# Patient Record
Sex: FEMALE | Race: ASIAN | NOT HISPANIC OR LATINO | URBAN - METROPOLITAN AREA
[De-identification: names, ages, dates, MRNs, and addresses within clinical notes are randomized per-mention and may not be internally consistent; named-entity substitution may affect disease eponyms.]

---

## 2017-03-06 ENCOUNTER — OFFICE VISIT - HEALTHEAST (OUTPATIENT)
Dept: FAMILY MEDICINE | Facility: CLINIC | Age: 62
End: 2017-03-06

## 2017-03-06 DIAGNOSIS — E55.9 VITAMIN D DEFICIENCY: ICD-10-CM

## 2017-03-06 DIAGNOSIS — Z00.00 ROUTINE GENERAL MEDICAL EXAMINATION AT A HEALTH CARE FACILITY: ICD-10-CM

## 2017-03-06 DIAGNOSIS — R17 TOTAL BILIRUBIN, ELEVATED: ICD-10-CM

## 2017-03-06 LAB
CHOLEST SERPL-MCNC: 198 MG/DL
FASTING STATUS PATIENT QL REPORTED: YES
HDLC SERPL-MCNC: 62 MG/DL
LDLC SERPL CALC-MCNC: 117 MG/DL
TRIGL SERPL-MCNC: 97 MG/DL

## 2017-03-06 ASSESSMENT — MIFFLIN-ST. JEOR: SCORE: 986.33

## 2017-03-08 ENCOUNTER — AMBULATORY - HEALTHEAST (OUTPATIENT)
Dept: FAMILY MEDICINE | Facility: CLINIC | Age: 62
End: 2017-03-08

## 2017-03-09 LAB
HPV INTERPRETATION - HISTORICAL: NORMAL
HPV INTERPRETER - HISTORICAL: NORMAL

## 2017-03-10 ENCOUNTER — RECORDS - HEALTHEAST (OUTPATIENT)
Dept: ADMINISTRATIVE | Facility: OTHER | Age: 62
End: 2017-03-10

## 2017-03-10 ENCOUNTER — RECORDS - HEALTHEAST (OUTPATIENT)
Dept: BONE DENSITY | Facility: CLINIC | Age: 62
End: 2017-03-10

## 2017-03-10 DIAGNOSIS — Z00.00 ENCOUNTER FOR GENERAL ADULT MEDICAL EXAMINATION WITHOUT ABNORMAL FINDINGS: ICD-10-CM

## 2017-03-13 LAB
BKR LAB AP ABNORMAL BLEEDING: NO
BKR LAB AP BIRTH CONTROL/HORMONES: NORMAL
BKR LAB AP CERVICAL APPEARANCE: NORMAL
BKR LAB AP GYN ADEQUACY: NORMAL
BKR LAB AP GYN INTERPRETATION: NORMAL
BKR LAB AP HPV REFLEX: NORMAL
BKR LAB AP LMP: NORMAL
BKR LAB AP PATIENT STATUS: NORMAL
BKR LAB AP PREVIOUS ABNORMAL: NORMAL
BKR LAB AP PREVIOUS NORMAL: 2013
HIGH RISK?: NO
PATH REPORT.COMMENTS IMP SPEC: NORMAL
RESULT FLAG (HE HISTORICAL CONVERSION): NORMAL

## 2017-03-22 ENCOUNTER — AMBULATORY - HEALTHEAST (OUTPATIENT)
Dept: NURSING | Facility: CLINIC | Age: 62
End: 2017-03-22

## 2017-03-26 ENCOUNTER — COMMUNICATION - HEALTHEAST (OUTPATIENT)
Dept: FAMILY MEDICINE | Facility: CLINIC | Age: 62
End: 2017-03-26

## 2017-06-01 ENCOUNTER — COMMUNICATION - HEALTHEAST (OUTPATIENT)
Dept: FAMILY MEDICINE | Facility: CLINIC | Age: 62
End: 2017-06-01

## 2017-06-05 ENCOUNTER — COMMUNICATION - HEALTHEAST (OUTPATIENT)
Dept: FAMILY MEDICINE | Facility: CLINIC | Age: 62
End: 2017-06-05

## 2017-06-13 ENCOUNTER — OFFICE VISIT - HEALTHEAST (OUTPATIENT)
Dept: FAMILY MEDICINE | Facility: CLINIC | Age: 62
End: 2017-06-13

## 2017-06-13 DIAGNOSIS — M81.0 OSTEOPOROSIS: ICD-10-CM

## 2017-06-13 ASSESSMENT — MIFFLIN-ST. JEOR: SCORE: 990.87

## 2017-07-11 ENCOUNTER — COMMUNICATION - HEALTHEAST (OUTPATIENT)
Dept: FAMILY MEDICINE | Facility: CLINIC | Age: 62
End: 2017-07-11

## 2017-12-14 ENCOUNTER — COMMUNICATION - HEALTHEAST (OUTPATIENT)
Dept: FAMILY MEDICINE | Facility: CLINIC | Age: 62
End: 2017-12-14

## 2017-12-18 ENCOUNTER — COMMUNICATION - HEALTHEAST (OUTPATIENT)
Dept: FAMILY MEDICINE | Facility: CLINIC | Age: 62
End: 2017-12-18

## 2018-03-01 ENCOUNTER — AMBULATORY - HEALTHEAST (OUTPATIENT)
Dept: FAMILY MEDICINE | Facility: CLINIC | Age: 63
End: 2018-03-01

## 2018-03-01 ENCOUNTER — AMBULATORY - HEALTHEAST (OUTPATIENT)
Dept: NURSING | Facility: CLINIC | Age: 63
End: 2018-03-01

## 2018-03-01 DIAGNOSIS — Z23 NEED FOR VACCINATION: ICD-10-CM

## 2018-04-23 ENCOUNTER — COMMUNICATION - HEALTHEAST (OUTPATIENT)
Dept: FAMILY MEDICINE | Facility: CLINIC | Age: 63
End: 2018-04-23

## 2018-04-23 ENCOUNTER — AMBULATORY - HEALTHEAST (OUTPATIENT)
Dept: SCHEDULING | Facility: CLINIC | Age: 63
End: 2018-04-23

## 2018-04-23 DIAGNOSIS — M81.0 OSTEOPOROSIS: ICD-10-CM

## 2018-05-10 ENCOUNTER — RECORDS - HEALTHEAST (OUTPATIENT)
Dept: GENERAL RADIOLOGY | Facility: CLINIC | Age: 63
End: 2018-05-10

## 2018-05-10 ENCOUNTER — OFFICE VISIT - HEALTHEAST (OUTPATIENT)
Dept: FAMILY MEDICINE | Facility: CLINIC | Age: 63
End: 2018-05-10

## 2018-05-10 DIAGNOSIS — Z00.00 ROUTINE GENERAL MEDICAL EXAMINATION AT A HEALTH CARE FACILITY: ICD-10-CM

## 2018-05-10 DIAGNOSIS — M25.552 LEFT HIP PAIN: ICD-10-CM

## 2018-05-10 DIAGNOSIS — M25.552 PAIN IN LEFT HIP: ICD-10-CM

## 2018-05-10 DIAGNOSIS — M81.0 OSTEOPOROSIS: ICD-10-CM

## 2018-05-10 LAB
CHOLEST SERPL-MCNC: 229 MG/DL
FASTING STATUS PATIENT QL REPORTED: YES
FASTING STATUS PATIENT QL REPORTED: YES
GLUCOSE BLD-MCNC: 86 MG/DL (ref 70–125)
HDLC SERPL-MCNC: 75 MG/DL
HGB BLD-MCNC: 13.4 G/DL (ref 12–16)
LDLC SERPL CALC-MCNC: 140 MG/DL
TRIGL SERPL-MCNC: 70 MG/DL
TSH SERPL DL<=0.005 MIU/L-ACNC: 1.96 UIU/ML (ref 0.3–5)

## 2018-05-10 ASSESSMENT — MIFFLIN-ST. JEOR: SCORE: 991.44

## 2018-05-11 LAB — 25(OH)D3 SERPL-MCNC: 33.9 NG/ML (ref 30–80)

## 2018-05-14 ENCOUNTER — COMMUNICATION - HEALTHEAST (OUTPATIENT)
Dept: FAMILY MEDICINE | Facility: CLINIC | Age: 63
End: 2018-05-14

## 2018-10-19 ENCOUNTER — COMMUNICATION - HEALTHEAST (OUTPATIENT)
Dept: FAMILY MEDICINE | Facility: CLINIC | Age: 63
End: 2018-10-19

## 2019-03-25 ENCOUNTER — COMMUNICATION - HEALTHEAST (OUTPATIENT)
Dept: FAMILY MEDICINE | Facility: CLINIC | Age: 64
End: 2019-03-25

## 2019-03-30 ENCOUNTER — AMBULATORY - HEALTHEAST (OUTPATIENT)
Dept: FAMILY MEDICINE | Facility: CLINIC | Age: 64
End: 2019-03-30

## 2019-03-30 DIAGNOSIS — M81.8 OTHER OSTEOPOROSIS WITHOUT CURRENT PATHOLOGICAL FRACTURE: ICD-10-CM

## 2019-04-01 ENCOUNTER — AMBULATORY - HEALTHEAST (OUTPATIENT)
Dept: SCHEDULING | Facility: CLINIC | Age: 64
End: 2019-04-01

## 2019-04-01 DIAGNOSIS — M81.8 OTHER OSTEOPOROSIS WITHOUT CURRENT PATHOLOGICAL FRACTURE: ICD-10-CM

## 2019-04-06 ENCOUNTER — COMMUNICATION - HEALTHEAST (OUTPATIENT)
Dept: FAMILY MEDICINE | Facility: CLINIC | Age: 64
End: 2019-04-06

## 2019-04-06 DIAGNOSIS — M80.00XG AGE-RELATED OSTEOPOROSIS WITH CURRENT PATHOLOGICAL FRACTURE WITH DELAYED HEALING, SUBSEQUENT ENCOUNTER: ICD-10-CM

## 2019-05-08 ENCOUNTER — COMMUNICATION - HEALTHEAST (OUTPATIENT)
Dept: FAMILY MEDICINE | Facility: CLINIC | Age: 64
End: 2019-05-08

## 2019-05-08 DIAGNOSIS — M80.00XG AGE-RELATED OSTEOPOROSIS WITH CURRENT PATHOLOGICAL FRACTURE WITH DELAYED HEALING, SUBSEQUENT ENCOUNTER: ICD-10-CM

## 2019-11-06 ENCOUNTER — COMMUNICATION - HEALTHEAST (OUTPATIENT)
Dept: FAMILY MEDICINE | Facility: CLINIC | Age: 64
End: 2019-11-06

## 2019-11-06 DIAGNOSIS — M80.00XG AGE-RELATED OSTEOPOROSIS WITH CURRENT PATHOLOGICAL FRACTURE WITH DELAYED HEALING, SUBSEQUENT ENCOUNTER: ICD-10-CM

## 2019-11-07 RX ORDER — ALENDRONATE SODIUM 70 MG/1
70 TABLET ORAL
Qty: 16 TABLET | Refills: 1 | Status: SHIPPED | OUTPATIENT
Start: 2019-11-07

## 2021-05-29 ENCOUNTER — RECORDS - HEALTHEAST (OUTPATIENT)
Dept: ADMINISTRATIVE | Facility: CLINIC | Age: 66
End: 2021-05-29

## 2021-05-30 ENCOUNTER — RECORDS - HEALTHEAST (OUTPATIENT)
Dept: ADMINISTRATIVE | Facility: CLINIC | Age: 66
End: 2021-05-30

## 2021-05-30 VITALS — BODY MASS INDEX: 18.25 KG/M2 | WEIGHT: 103 LBS | HEIGHT: 63 IN

## 2021-05-31 VITALS — HEIGHT: 63 IN | WEIGHT: 104 LBS | BODY MASS INDEX: 18.43 KG/M2

## 2021-06-01 VITALS — BODY MASS INDEX: 18.61 KG/M2 | HEIGHT: 63 IN | WEIGHT: 105 LBS

## 2021-06-09 NOTE — PROGRESS NOTES
Assessment/Plan:       1. Routine general medical examination at a health care facility  Healthy 62-year-old female.  We will refer her for a bone density scan.  We'll do a screening hemoglobin glucose and lipid cascade.  In the past her cholesterol is been quite good.  She is of normal weight and eat healthy and remains active.  Family history is fairly unremarkable and she is up-to-date on her mammogram and colonoscopy.  - DXA Bone Density Scan; Future  - Hemoglobin  - Lipid Cascade FASTING  - Varicella Zoster Immune Status Antibody, IgG  - Glucose    2. Total bilirubin, elevated  She's had an elevated isolated total bilirubin level.  No abdominal symptoms, risk factors for liver disease.  We will check a hepatic profile again this year.  - Hepatic Profile    3. Vitamin D deficiency  She has a history of having low vitamin D levels and is currently taking a supplement.  - Vitamin D, Total (25-Hydroxy)    4.  She reported that her  noted a slight head twitching.  He states that it's been fairly common.  On exam today there is no evidence of the twitch and no other tremor or gait disorder noted.  We discussed that if her symptoms are increasing that she does need to return for further evaluation.    5.  Intermittent hoarseness when she is talking too much.  Exam today is benign.  No history of smoking.  Encouraged her to stay well hydrated and follow up if the symptoms are worsening.          Subjective:     Eleni Bledsoe is a 62 y.o. female who presents for an annual exam.     Healthy Habits:   Healthy Diet: Yes  Regular Exercise: Yes  Sunscreen Use: Yes  Dental Visits Regularly: Yes  Seat Belt: Yes  Self Breast Exam Monthly:Yes    Health Maintenance reviewed - yes.  Lipid Profile: Yes  Glucose Screen: Yes  Last Mammogram: Yes  Colonoscopy: Yes  Last Dexa: No    Immunization History   Administered Date(s) Administered     Influenza, seasonal,quad inj 6-35 mos 11/05/2012     Tdap 01/17/2012     Immunization  status: up to date and documented.      Gynecologic History  No LMP recorded. Patient is postmenopausal.  Sexually active: Yes  Contraception: post menopausal status  Last Pap: . Results were: ascus      OB History    Para Term  AB SAB TAB Ectopic Multiple Living   1 1 1             # Outcome Date GA Lbr Magdiel/2nd Weight Sex Delivery Anes PTL Lv   1 Term                   Current Outpatient Prescriptions   Medication Sig Dispense Refill     co-enzyme Q-10 30 mg capsule Take 30 mg by mouth 3 (three) times a day.       multivitamin therapeutic (THERAGRAN) tablet Take 1 tablet by mouth daily.       MV,CA,MIN/FA/HERBAL NO.157 (ESTROVEN MAXIMUM STRENGTH ORAL) Take 1 capsule by mouth daily.       No current facility-administered medications for this visit.      History reviewed. No pertinent past medical history.  History reviewed. No pertinent surgical history.  Review of patient's allergies indicates no known allergies.  Family History   Problem Relation Age of Onset     No Medical Problems Mother      No Medical Problems Father      No Medical Problems Sister      No Medical Problems Daughter      No Medical Problems Maternal Grandmother      No Medical Problems Maternal Grandfather      No Medical Problems Paternal Grandmother      No Medical Problems Paternal Grandfather      No Medical Problems Maternal Aunt      No Medical Problems Paternal Aunt      BRCA 1/2 Neg Hx      Breast cancer Neg Hx      Cancer Neg Hx      Colon cancer Neg Hx      Endometrial cancer Neg Hx      Ovarian cancer Neg Hx      Social History     Social History     Marital status:      Spouse name: N/A     Number of children: N/A     Years of education: N/A     Occupational History     Not on file.     Social History Main Topics     Smoking status: Never Smoker     Smokeless tobacco: Not on file     Alcohol use Not on file     Drug use: Not on file     Sexual activity: Not on file     Other Topics Concern     Not on file  "    Social History Narrative       Review of Systems  General:  Denies problem  Eyes: Denies problem  Ears/Nose/Throat: Denies problem  Cardiovascular: Denies problem  Respiratory:  Denies problem  Gastrointestinal:  Denies problem, Genitourinary: Denies problem  Musculoskeletal:  Denies problem  Skin: Denies problem  Neurologic: Denies problem  Psychiatric: Denies problem  Endocrine: Denies problem  Heme/Lymphatic: Denies problem   Allergic/Immunologic: Denies problem            Objective:        Vitals:    03/06/17 0944   BP: 100/70   Pulse: 72   Resp: 18   Weight: 103 lb (46.7 kg)   Height: 5' 3\" (1.6 m)       Physical Exam:  General Appearance: Alert, pleasant, appears stated age  Head: Normocephalic, without obvious abnormality  Eyes: PERRL, conjunctiva/corneas clear, EOM's intact  Ears: Normal TM's and external ear canals, both ears  Nose: Nares normal, septum midline,mucosa normal, no drainage  Throat: Lips, mucosa, and tongue normal; teeth and gums normal; oropharynx is clear  Neck: Supple,without lymphadenopathy or thyromegally  Lungs: Clear to auscultation bilaterally, respirations unlabored  Breasts: Nopalpable masses, tenderness, asymmetry, or nipple discharge. No axillary or supraclavicular lymphadenopathy  Heart: Regular rate and rhythm, no murmur   Abdomen: Soft, non-tender, no masses, no organomegaly  Pelvic:Normally developed genitalia with no external lesions or eruptions. Vagina and cervix show no lesions, inflammation, discharge or tenderness. No cystocele, No rectocele. Uterus normal.  No adnexal mass or tenderness.    Extremities: Extremities with strong and symmetric pulses, no cyanosis or edema  Skin: Skin color, texture normal, no rashes or lesions    Neurologic: Normal          "

## 2021-06-16 PROBLEM — M81.0 OSTEOPOROSIS: Status: ACTIVE | Noted: 2017-06-13

## 2021-06-17 NOTE — PROGRESS NOTES
ASSESSMENT/PLAN:  1. Routine general medical examination at a health care facility  63-year-old female.  She is up-to-date on her Pap smears.  She is advised every 2 year mammogram screenings.  Will check fasting labs today.  Encouraged healthy eating and weightbearing exercise.  - Lipid Cascade FASTING  - Glucose  - Hemoglobin    2. Osteoporosis  Known osteoporosis.  Her bone density scan is stable from last year but still is showing osteoporosis.  She is at high risk for fracture.  I do encourage her to start Fosamax and she agrees to try.  We discussed the side effects of the medication and how to take it.  Will recheck bone density in 1 year.  Encouraged her to continue with 1200 mg of calcium 1000 mg of vitamin D as well as weightbearing exercise and resistance training.  - Thyroid Cascade  - Vitamin D, Total (25-Hydroxy)    3. Left hip pain  Mild left hip pain.  Pain patterns consistent with mild osteoarthritis.  Exam today is normal and hip x-ray is also normal.  Encouraged her to continue to wear good shoes and continue to be active and follow-up if symptoms are worsening.  - XR Hip Left 2 or More VWS; Future    Feeling of globus in her throat with some difficulty swallowing.  Does not happen all the time and has been present for about a year.  We decided to do a trial of omeprazole 20 mg daily for over a month.  If the symptoms do not improve, consider referral to ENT.  There were no masses palpable.  An oral exam is normal.  She is not a smoker.    General Health Maintenance  Her most recent DXA was completed in 2018 and revealed osteoporosis and high fracture risk with a lowest T-score of -3.0 in the right femoral neck. She is to have a follow up DXA 1-2 year(s) from the date it was completed.  Her most recent mammogram was completed in 2016 and revealed no radiographic evidence of malignancy. She is to continue routine screening mammogram as recommended. She is due for a mammogram.  Her most recent PAP  smear was completed in 2017 and was normal.   Her most recent colonoscopy was completed in 2013 and was normal. She is on a 10-year screening plan.    Patient Instructions   Start taking Fosamax once weekly. The biggest side effects are heartburn and irritation to the esophagus. These can be severe. Osteonecrosis of the jaw can happen, however this side effect is rare and occurs mostly for those with a history of jaw cancer or with major dental work.    To minimize adverse side effects:   - Take first thing in the morning, sitting up, before you eat  - Take it at least 30 minutes before you eat anything    Repeat bone density scan (DXA) in 1 year to see how the Fosamax is working.     Continue taking Calcium and Vitamin D supplements.     Start taking Prilosec (omeprazole) for reflux.  - Taking this medication may help minimize throat irritation side effects from Fosamax at the beginning  - If you stop taking omeprazole, you could start to experience irritation to the esophagus from the Fosamax      Orders Placed This Encounter   Procedures     XR Hip Left 2 or More VWS     Standing Status:   Future     Number of Occurrences:   1     Standing Expiration Date:   5/10/2019     Order Specific Question:   Can the procedure be changed per Radiologist protocol?     Answer:   Yes     Lipid [place] FASTING     Order Specific Question:   Fasting is required?     Answer:   Yes     Thyroid Cascade     Vitamin D, Total (25-Hydroxy)     Glucose     Hemoglobin     Medications Discontinued During This Encounter   Medication Reason     alendronate (FOSAMAX) 70 MG tablet Side effects       No Follow-up on file.    CHIEF COMPLAINT:  Chief Complaint   Patient presents with     Annual Exam       HISTORY OF PRESENT ILLNESS:  Eleni is a 63 y.o. female presenting to the clinic today for an annual exam. She is doing well. An interpretor is used during this visit.     Left Hip Pain: She endorses occasional numbness in her left leg with  walking. She feels this was present last year, but that it is worsening now. If she walks long distances, she experiences stiffness in her left knee. At the start of her walk and after walking for an extended period of time, she feels the most discomfort. Stairs are not a problem for her. She denies back pain. She denies pain to palpation. She is wondering if arthritis would be related to osteoporosis.     Throat Discomfort: She endorses discomfort in her throat. Swallowing dry food worsens her symptoms. She feels she cannot swallow smoothly. She does not cough after meals. This discomfort is present with only dry food and when she talks for longer periods of time. If she talks for extended periods of time, she does feel her voice changes. She does not feel this is worsening. She endorses an intermittent dry cough after eating. She declines an upper endoscopy at this time.     REVIEW OF SYSTEMS:   Osteoporosis: Her most recent DXA was completed in 2018 and revealed high fracture risk with a lowest T-score of -3.0 in the right femoral neck. She was previously started on Fosamax 6/13/17, but she did not ever take the medication. She was told about the side effects and she did not want to take it. She takes a calcium supplement and tries to exercise more. She is agreeable to trying Fosamax at this time. She is wondering if other vitamins are helpful, aside from calcium and vitamin D.     She denies problems with bowel or bladder function. She sees a dentist regularly. Her hearing is good. Her vision is unchanged. She wears glasses. All other systems are negative.    PFSH:  She denies a family history of thyroid problems. Reviewed, as below.    History   Smoking Status     Never Smoker   Smokeless Tobacco     Never Used       Family History   Problem Relation Age of Onset     No Medical Problems Mother      No Medical Problems Father      No Medical Problems Sister      No Medical Problems Daughter      No Medical  "Problems Maternal Grandmother      No Medical Problems Maternal Grandfather      No Medical Problems Paternal Grandmother      No Medical Problems Paternal Grandfather      No Medical Problems Maternal Aunt      No Medical Problems Paternal Aunt      BRCA 1/2 Neg Hx      Breast cancer Neg Hx      Cancer Neg Hx      Colon cancer Neg Hx      Endometrial cancer Neg Hx      Ovarian cancer Neg Hx      Thyroid disease Neg Hx        Social History     Social History     Marital status:      Spouse name: N/A     Number of children: N/A     Years of education: N/A     Occupational History     Not on file.     Social History Main Topics     Smoking status: Never Smoker     Smokeless tobacco: Never Used     Alcohol use Not on file     Drug use: Not on file     Sexual activity: Not on file     Other Topics Concern     Not on file     Social History Narrative     History reviewed. No pertinent surgical history.    No Known Allergies    Active Ambulatory Problems     Diagnosis Date Noted     Arthropathy Of Multiple Sites      Osteoporosis 06/13/2017     Resolved Ambulatory Problems     Diagnosis Date Noted     Atypical Chest Pain      Hoarseness      Urticaria      Dermatitis      No Additional Past Medical History       VITALS:  Vitals:    05/10/18 0825   BP: 102/80   Pulse: 72   Resp: 16   Weight: 105 lb (47.6 kg)   Height: 5' 2.75\" (1.594 m)     Wt Readings from Last 3 Encounters:   05/10/18 105 lb (47.6 kg)   06/13/17 104 lb (47.2 kg)   03/06/17 103 lb (46.7 kg)     Body mass index is 18.75 kg/(m^2).    PHYSICAL EXAM:  General Appearance: Alert, pleasant, appears stated age  Head: Normocephalic, without obvious abnormality  Eyes: PERRL, conjunctiva/corneas clear, EOM's intact  Ears: Normal TM's and external ear canals, both ears  Nose: Nares normal, septum midline,mucosa normal, no drainage  Throat: Lips, mucosa, and tongue normal; teeth and gums normal; oropharynx is clear  Neck: Supple, without lymphadenopathy or " thyromegaly  Lungs: Clear to auscultation bilaterally, respirations unlabored  Breasts: No palpable masses, tenderness, asymmetry, or nipple discharge. No axillary or supraclavicular lymphadenopathy  Heart: Regular rate and rhythm, no murmur   Abdomen: Soft, non-tender, no masses, no organomegaly  Extremities: Extremities with strong and symmetric pulses, no cyanosis or edema  Left Hip: Not tender, no pain with movement, no tenderness over bursa or in groin, prominent hips, more on left  Skin: Skin color, texture normal, no rashes or lesions  Neuro: Normal    Hip XR: Normal and personally reviewed    RECENT RESULTS  Recent Results (from the past 48 hour(s))   Glucose    Collection Time: 05/10/18  9:22 AM   Result Value Ref Range    Glucose 86 70 - 125 mg/dL    Patient Fasting > 8hrs? Yes    Hemoglobin    Collection Time: 05/10/18  9:22 AM   Result Value Ref Range    Hemoglobin 13.4 12.0 - 16.0 g/dL       ADDITIONAL HISTORY SUMMARIZED (2): None.  DECISION TO OBTAIN EXTRA INFORMATION (1): None.   RADIOLOGY TESTS (1): DXA 2018 reviewed, high fracture risk with a lowest T-score of -3.0 in the right femoral neck. Mammogram 11/2016 reviewed, normal. XR Hip ordered.   LABS (1): Labs ordered today.  MEDICINE TESTS (1): None.  INDEPENDENT REVIEW (2 each): XR Hip personally interpreted.     The visit lasted a total of 33 minutes face to face with the patient. Over 50% of the time was spent counseling and educating the patient about osteoporosis, throat discomfort, and paresthesia in the left leg.    IMarianna, am scribing for and in the presence of, Dr. Medeiros.    IDr. Medeiros personally performed the services described in this documentation, as scribed by Marianna Wilson in my presence, and it is both accurate and complete.    MEDICATIONS:  Current Outpatient Prescriptions   Medication Sig Dispense Refill     multivitamin therapeutic (THERAGRAN) tablet Take 1 tablet by mouth daily.       alendronate (FOSAMAX) 70 MG  tablet Take 1 tablet (70 mg total) by mouth every 7 days. Take in the morning on an empty stomach with a full glass of water 30 minutes before food 4 tablet 11     co-enzyme Q-10 30 mg capsule Take 30 mg by mouth 3 (three) times a day.       MV,CA,MIN/FA/HERBAL NO.157 (ESTROVEN MAXIMUM STRENGTH ORAL) Take 1 capsule by mouth daily.       omeprazole (PRILOSEC) 20 MG capsule Take 1 capsule (20 mg total) by mouth daily before breakfast. 30 capsule 2     No current facility-administered medications for this visit.        Total data points: 4

## 2021-07-21 ENCOUNTER — RECORDS - HEALTHEAST (OUTPATIENT)
Dept: ADMINISTRATIVE | Facility: CLINIC | Age: 66
End: 2021-07-21

## 2021-08-21 ENCOUNTER — HEALTH MAINTENANCE LETTER (OUTPATIENT)
Age: 66
End: 2021-08-21

## 2021-10-16 ENCOUNTER — HEALTH MAINTENANCE LETTER (OUTPATIENT)
Age: 66
End: 2021-10-16

## 2022-02-05 ENCOUNTER — HEALTH MAINTENANCE LETTER (OUTPATIENT)
Age: 67
End: 2022-02-05

## 2022-04-02 ENCOUNTER — HEALTH MAINTENANCE LETTER (OUTPATIENT)
Age: 67
End: 2022-04-02

## 2022-10-01 ENCOUNTER — HEALTH MAINTENANCE LETTER (OUTPATIENT)
Age: 67
End: 2022-10-01

## 2023-05-14 ENCOUNTER — HEALTH MAINTENANCE LETTER (OUTPATIENT)
Age: 68
End: 2023-05-14

## 2023-10-15 ENCOUNTER — HEALTH MAINTENANCE LETTER (OUTPATIENT)
Age: 68
End: 2023-10-15